# Patient Record
Sex: FEMALE
[De-identification: names, ages, dates, MRNs, and addresses within clinical notes are randomized per-mention and may not be internally consistent; named-entity substitution may affect disease eponyms.]

---

## 2017-09-14 PROBLEM — Z00.00 ENCOUNTER FOR PREVENTIVE HEALTH EXAMINATION: Status: ACTIVE | Noted: 2017-09-14

## 2017-09-27 ENCOUNTER — APPOINTMENT (OUTPATIENT)
Dept: CARDIOLOGY | Facility: CLINIC | Age: 62
End: 2017-09-27
Payer: COMMERCIAL

## 2017-09-27 DIAGNOSIS — R53.83 OTHER FATIGUE: ICD-10-CM

## 2017-09-27 DIAGNOSIS — I00 RHEUMATIC FEVER W/OUT HEART INVOLVEMENT: ICD-10-CM

## 2017-09-27 DIAGNOSIS — F41.9 ANXIETY DISORDER, UNSPECIFIED: ICD-10-CM

## 2017-09-27 PROCEDURE — 99214 OFFICE O/P EST MOD 30 MIN: CPT

## 2017-09-27 PROCEDURE — 93000 ELECTROCARDIOGRAM COMPLETE: CPT

## 2018-08-20 ENCOUNTER — APPOINTMENT (OUTPATIENT)
Dept: CARDIOLOGY | Facility: CLINIC | Age: 63
End: 2018-08-20
Payer: COMMERCIAL

## 2018-08-20 ENCOUNTER — NON-APPOINTMENT (OUTPATIENT)
Age: 63
End: 2018-08-20

## 2018-08-20 VITALS
DIASTOLIC BLOOD PRESSURE: 69 MMHG | SYSTOLIC BLOOD PRESSURE: 105 MMHG | BODY MASS INDEX: 21.68 KG/M2 | HEIGHT: 64 IN | WEIGHT: 127 LBS | HEART RATE: 55 BPM | OXYGEN SATURATION: 98 %

## 2018-08-20 DIAGNOSIS — Z80.1 FAMILY HISTORY OF MALIGNANT NEOPLASM OF TRACHEA, BRONCHUS AND LUNG: ICD-10-CM

## 2018-08-20 DIAGNOSIS — Z87.898 PERSONAL HISTORY OF OTHER SPECIFIED CONDITIONS: ICD-10-CM

## 2018-08-20 DIAGNOSIS — Z87.891 PERSONAL HISTORY OF NICOTINE DEPENDENCE: ICD-10-CM

## 2018-08-20 DIAGNOSIS — Z87.39 PERSONAL HISTORY OF OTHER DISEASES OF THE MUSCULOSKELETAL SYSTEM AND CONNECTIVE TISSUE: ICD-10-CM

## 2018-08-20 DIAGNOSIS — Z86.79 PERSONAL HISTORY OF OTHER DISEASES OF THE CIRCULATORY SYSTEM: ICD-10-CM

## 2018-08-20 DIAGNOSIS — Z87.19 PERSONAL HISTORY OF OTHER DISEASES OF THE DIGESTIVE SYSTEM: ICD-10-CM

## 2018-08-20 PROCEDURE — 99214 OFFICE O/P EST MOD 30 MIN: CPT

## 2018-08-20 RX ORDER — TRAZODONE HYDROCHLORIDE 100 MG/1
100 TABLET ORAL DAILY
Refills: 0 | Status: ACTIVE | COMMUNITY

## 2018-08-29 ENCOUNTER — APPOINTMENT (OUTPATIENT)
Dept: CARDIOLOGY | Facility: CLINIC | Age: 63
End: 2018-08-29

## 2018-08-30 ENCOUNTER — APPOINTMENT (OUTPATIENT)
Dept: CARDIOLOGY | Facility: CLINIC | Age: 63
End: 2018-08-30

## 2018-08-31 DIAGNOSIS — R42 DIZZINESS AND GIDDINESS: ICD-10-CM

## 2018-08-31 PROBLEM — F41.9 ANXIETY: Status: ACTIVE | Noted: 2018-08-15

## 2018-08-31 PROBLEM — R53.83 FATIGUE: Status: ACTIVE | Noted: 2018-08-20

## 2018-08-31 PROBLEM — I00 RHEUMATIC FEVER: Status: ACTIVE | Noted: 2018-08-15

## 2018-09-06 PROBLEM — R42 DIZZINESS: Status: ACTIVE | Noted: 2018-08-31

## 2018-09-14 ENCOUNTER — APPOINTMENT (OUTPATIENT)
Dept: CARDIOLOGY | Facility: CLINIC | Age: 63
End: 2018-09-14
Payer: COMMERCIAL

## 2018-09-14 PROCEDURE — 93306 TTE W/DOPPLER COMPLETE: CPT

## 2018-09-21 ENCOUNTER — APPOINTMENT (OUTPATIENT)
Dept: CARDIOLOGY | Facility: CLINIC | Age: 63
End: 2018-09-21
Payer: COMMERCIAL

## 2018-09-21 PROCEDURE — 93880 EXTRACRANIAL BILAT STUDY: CPT

## 2018-10-05 ENCOUNTER — RX RENEWAL (OUTPATIENT)
Age: 63
End: 2018-10-05

## 2018-10-09 ENCOUNTER — MEDICATION RENEWAL (OUTPATIENT)
Age: 63
End: 2018-10-09

## 2018-10-10 ENCOUNTER — APPOINTMENT (OUTPATIENT)
Dept: CARDIOLOGY | Facility: CLINIC | Age: 63
End: 2018-10-10

## 2018-10-10 RX ORDER — ATORVASTATIN CALCIUM 40 MG/1
40 TABLET, FILM COATED ORAL DAILY
Qty: 90 | Refills: 1 | Status: ACTIVE | COMMUNITY
Start: 1900-01-01 | End: 1900-01-01

## 2018-11-02 ENCOUNTER — APPOINTMENT (OUTPATIENT)
Dept: CARDIOLOGY | Facility: CLINIC | Age: 63
End: 2018-11-02
Payer: COMMERCIAL

## 2018-11-02 VITALS
BODY MASS INDEX: 20.14 KG/M2 | HEIGHT: 64 IN | DIASTOLIC BLOOD PRESSURE: 58 MMHG | SYSTOLIC BLOOD PRESSURE: 102 MMHG | WEIGHT: 118 LBS | HEART RATE: 64 BPM

## 2018-11-02 DIAGNOSIS — E78.5 HYPERLIPIDEMIA, UNSPECIFIED: ICD-10-CM

## 2018-11-02 DIAGNOSIS — I65.29 OCCLUSION AND STENOSIS OF UNSPECIFIED CAROTID ARTERY: ICD-10-CM

## 2018-11-02 DIAGNOSIS — Z82.49 FAMILY HISTORY OF ISCHEMIC HEART DISEASE AND OTHER DISEASES OF THE CIRCULATORY SYSTEM: ICD-10-CM

## 2018-11-02 PROCEDURE — 99214 OFFICE O/P EST MOD 30 MIN: CPT

## 2018-11-03 PROBLEM — Z82.49 FAMILY HISTORY OF MYOCARDIAL INFARCTION: Status: ACTIVE | Noted: 2018-08-15

## 2018-11-03 PROBLEM — E78.5 HYPERLIPIDEMIA, UNSPECIFIED HYPERLIPIDEMIA TYPE: Status: ACTIVE | Noted: 2018-08-15

## 2018-11-03 PROBLEM — I65.29 CAROTID ATHEROSCLEROSIS: Status: ACTIVE | Noted: 2018-11-03

## 2018-11-03 NOTE — PHYSICAL EXAM
[General Appearance - Well Developed] : well developed [Normal Appearance] : normal appearance [Well Groomed] : well groomed [General Appearance - Well Nourished] : well nourished [No Deformities] : no deformities [General Appearance - In No Acute Distress] : no acute distress [Respiration, Rhythm And Depth] : normal respiratory rhythm and effort [Exaggerated Use Of Accessory Muscles For Inspiration] : no accessory muscle use [Auscultation Breath Sounds / Voice Sounds] : lungs were clear to auscultation bilaterally [Heart Rate And Rhythm] : heart rate and rhythm were normal [Heart Sounds] : normal S1 and S2 [Murmurs] : no murmurs present [Abdomen Soft] : soft [Abdomen Tenderness] : non-tender [Abdomen Mass (___ Cm)] : no abdominal mass palpated [Abnormal Walk] : normal gait [Gait - Sufficient For Exercise Testing] : the gait was sufficient for exercise testing [Nail Clubbing] : no clubbing of the fingernails [Cyanosis, Localized] : no localized cyanosis [Petechial Hemorrhages (___cm)] : no petechial hemorrhages [Skin Color & Pigmentation] : normal skin color and pigmentation [] : no rash [No Venous Stasis] : no venous stasis [Skin Lesions] : no skin lesions [No Skin Ulcers] : no skin ulcer [No Xanthoma] : no  xanthoma was observed [Oriented To Time, Place, And Person] : oriented to person, place, and time [Affect] : the affect was normal [Mood] : the mood was normal [No Anxiety] : not feeling anxious [FreeTextEntry1] : no JVD

## 2018-11-03 NOTE — REASON FOR VISIT
[Follow-Up - Clinic] : a clinic follow-up of [Hyperlipidemia] : hyperlipidemia [Medication Management] : Medication management

## 2018-11-03 NOTE — ASSESSMENT
[FreeTextEntry1] : \par \par Above cardiovascular testing has been reviewed. \par h/o HL, +Fhx\par Active, minor BETTENCOURT\par Carotid Doppler demonstrates nonobstructive disease, mild to moderate\par Exercise test will be performed when she returns in [facility] in the springtime. \par Discussed the utility of coronary artery calcium score for further cardiovascular risk stratification\par Continue statin, previously changed from simvastatin to atorvastatin due to rise in her cholesterol despite medication compliance and lifestyle measures\par Instructed to call if any adverse effects.  \par Red flag symptoms which would warrant sooner emergent evaluation reviewed with the patient. \par Questions and concerns were addressed and answered.

## 2018-11-03 NOTE — REVIEW OF SYSTEMS
[Feeling Fatigued] : feeling fatigued [Negative] : Heme/Lymph [see HPI] : see HPI [Dyspnea on exertion] : dyspnea during exertion

## 2019-01-01 NOTE — HISTORY OF PRESENT ILLNESS
[FreeTextEntry1] : ROEL CURRAN  is a 63 year old  F\par history of anxiety, former alcohol abuse, past tobacco use, rheumatic fever at the age of 22, family history of cardiovascular disease, subclinical atherosclerosis, and a long-standing history of hyperlipidemia.  She is maintained on Lipitor 40 mg nightly. \par \par There is no prior history of a clinical myocardial infarction, coronary revascularization. \par There is no history of symptomatic congestive heart failure, rheumatic heart disease or valvular disease.\par There is no history of symptomatic arrhythmias including atrial fibrillation.\par \par She is active, running and does Pilates. Typically 3 miles 4 times a week. \par There is minor dyspnea on exertion. \par There is no exertional chest pain, pressure or discomfort. \par There is no orthopnea. \par There are no symptomatic palpitations, lightheadedness, dizziness or syncope.\par \par Of note, her  had been diagnosed with GB and received IVIG treatment \par Also had complicated gallbladder surgery, including sepsis, prolonged hospitalization. \par \par September 2018. Hemoglobin 14.9, potassium 4.0, and 0.7, HDL 77, LDL 82, TSH 2.8. \par \par Carotid Doppler demonstrates mild to moderate plaque. \par Echocardiogram demonstrates normal left ventricular function. \par \par EKG September 27, 2017, sinus bradycardia 57 beats per minute, normal ECG. \par \par Exercise tolerance test on October 1, 2013, no evidence of exercise-induced ischemia by EKG. The patient exercised for 12 minutes and 1 second on the Alexandre protocol, maximum blood pressure 130/70, maximum achieved heart rate 88%, and METs achieved 12.8.  3.26

## 2019-06-25 ENCOUNTER — APPOINTMENT (OUTPATIENT)
Dept: CARDIOLOGY | Facility: CLINIC | Age: 64
End: 2019-06-25

## 2019-07-09 ENCOUNTER — APPOINTMENT (OUTPATIENT)
Dept: CARDIOLOGY | Facility: CLINIC | Age: 64
End: 2019-07-09